# Patient Record
Sex: FEMALE | Race: WHITE | Employment: OTHER | URBAN - METROPOLITAN AREA
[De-identification: names, ages, dates, MRNs, and addresses within clinical notes are randomized per-mention and may not be internally consistent; named-entity substitution may affect disease eponyms.]

---

## 2017-05-25 ENCOUNTER — HOSPITAL ENCOUNTER (OUTPATIENT)
Dept: MAMMOGRAPHY | Facility: CLINIC | Age: 72
Discharge: HOME | End: 2017-05-25
Attending: INTERNAL MEDICINE | Admitting: INTERNAL MEDICINE
Payer: COMMERCIAL

## 2017-05-25 ENCOUNTER — OFFICE VISIT (OUTPATIENT)
Dept: FAMILY MEDICINE | Facility: CLINIC | Age: 72
End: 2017-05-25
Payer: COMMERCIAL

## 2017-05-25 VITALS
WEIGHT: 110 LBS | OXYGEN SATURATION: 100 % | DIASTOLIC BLOOD PRESSURE: 71 MMHG | BODY MASS INDEX: 19.49 KG/M2 | HEART RATE: 74 BPM | HEIGHT: 63 IN | SYSTOLIC BLOOD PRESSURE: 120 MMHG | TEMPERATURE: 97.4 F

## 2017-05-25 DIAGNOSIS — N95.2 ATROPHIC VAGINITIS: ICD-10-CM

## 2017-05-25 DIAGNOSIS — Z12.31 VISIT FOR SCREENING MAMMOGRAM: ICD-10-CM

## 2017-05-25 DIAGNOSIS — Z12.31 ENCOUNTER FOR SCREENING MAMMOGRAM FOR BREAST CANCER: ICD-10-CM

## 2017-05-25 DIAGNOSIS — L71.9 ROSACEA: ICD-10-CM

## 2017-05-25 DIAGNOSIS — L70.8 OTHER ACNE: ICD-10-CM

## 2017-05-25 DIAGNOSIS — Z00.00 ENCOUNTER FOR ROUTINE ADULT HEALTH EXAMINATION WITHOUT ABNORMAL FINDINGS: Primary | ICD-10-CM

## 2017-05-25 DIAGNOSIS — M85.80 OSTEOPENIA: ICD-10-CM

## 2017-05-25 DIAGNOSIS — R30.0 DYSURIA: ICD-10-CM

## 2017-05-25 LAB
ALBUMIN UR-MCNC: NEGATIVE MG/DL
APPEARANCE UR: CLEAR
BACTERIA #/AREA URNS HPF: ABNORMAL /HPF
BILIRUB UR QL STRIP: NEGATIVE
COLOR UR AUTO: YELLOW
ERYTHROCYTE [DISTWIDTH] IN BLOOD BY AUTOMATED COUNT: 13.4 % (ref 10–15)
GLUCOSE UR STRIP-MCNC: NEGATIVE MG/DL
HCT VFR BLD AUTO: 39.3 % (ref 35–47)
HGB BLD-MCNC: 13.2 G/DL (ref 11.7–15.7)
HGB UR QL STRIP: NEGATIVE
KETONES UR STRIP-MCNC: NEGATIVE MG/DL
LEUKOCYTE ESTERASE UR QL STRIP: ABNORMAL
MCH RBC QN AUTO: 30.4 PG (ref 26.5–33)
MCHC RBC AUTO-ENTMCNC: 33.6 G/DL (ref 31.5–36.5)
MCV RBC AUTO: 91 FL (ref 78–100)
NITRATE UR QL: NEGATIVE
NON-SQ EPI CELLS #/AREA URNS LPF: ABNORMAL /LPF
PH UR STRIP: 5.5 PH (ref 5–7)
PLATELET # BLD AUTO: 253 10E9/L (ref 150–450)
RBC # BLD AUTO: 4.34 10E12/L (ref 3.8–5.2)
RBC #/AREA URNS AUTO: ABNORMAL /HPF (ref 0–2)
SP GR UR STRIP: 1.01 (ref 1–1.03)
URN SPEC COLLECT METH UR: ABNORMAL
UROBILINOGEN UR STRIP-ACNC: 0.2 EU/DL (ref 0.2–1)
WBC # BLD AUTO: 6.2 10E9/L (ref 4–11)
WBC #/AREA URNS AUTO: ABNORMAL /HPF (ref 0–2)

## 2017-05-25 PROCEDURE — 80061 LIPID PANEL: CPT | Performed by: INTERNAL MEDICINE

## 2017-05-25 PROCEDURE — 36415 COLL VENOUS BLD VENIPUNCTURE: CPT | Performed by: INTERNAL MEDICINE

## 2017-05-25 PROCEDURE — 85027 COMPLETE CBC AUTOMATED: CPT | Performed by: INTERNAL MEDICINE

## 2017-05-25 PROCEDURE — 80053 COMPREHEN METABOLIC PANEL: CPT | Performed by: INTERNAL MEDICINE

## 2017-05-25 PROCEDURE — G0439 PPPS, SUBSEQ VISIT: HCPCS | Performed by: INTERNAL MEDICINE

## 2017-05-25 PROCEDURE — 81001 URINALYSIS AUTO W/SCOPE: CPT | Performed by: INTERNAL MEDICINE

## 2017-05-25 PROCEDURE — G0202 SCR MAMMO BI INCL CAD: HCPCS

## 2017-05-25 PROCEDURE — 82306 VITAMIN D 25 HYDROXY: CPT | Performed by: INTERNAL MEDICINE

## 2017-05-25 PROCEDURE — G0145 SCR C/V CYTO,THINLAYER,RESCR: HCPCS | Performed by: INTERNAL MEDICINE

## 2017-05-25 PROCEDURE — G0476 HPV COMBO ASSAY CA SCREEN: HCPCS | Performed by: INTERNAL MEDICINE

## 2017-05-25 RX ORDER — TRETINOIN 0.4 MG/G
GEL TOPICAL AT BEDTIME
Qty: 45 G | Refills: 3 | Status: SHIPPED | OUTPATIENT
Start: 2017-05-25 | End: 2019-05-06

## 2017-05-25 RX ORDER — TRETINOIN 1 MG/G
CREAM TOPICAL AT BEDTIME
Qty: 45 G | Refills: 3 | Status: SHIPPED | OUTPATIENT
Start: 2017-05-25 | End: 2017-05-25 | Stop reason: DRUGHIGH

## 2017-05-25 RX ORDER — TRETINOIN 0.4 MG/G
GEL TOPICAL
COMMUNITY
End: 2017-05-25

## 2017-05-25 NOTE — PROGRESS NOTES
SUBJECTIVE:                                                            Concepcion Velásquez is a 72 year old female who presents for Preventive Visit.    Are you in the first 12 months of your Medicare Part B coverage?  No    Healthy Habits:    Do you get at least three servings of calcium containing foods daily (dairy, green leafy vegetables, etc.)? yes    Amount of exercise or daily activities, outside of work: 7 day(s) per week    Problems taking medications regularly No    Medication side effects: No    Have you had an eye exam in the past two years? yes    Do you see a dentist twice per year? no    Do you have sleep apnea, excessive snoring or daytime drowsiness?no    COGNITIVE SCREEN  1) Repeat 3 items (Banana, Sunrise, Chair)    2) Clock draw: NORMAL  3) 3 item recall: Recalls 3 objects  Results: 3 items recalled: COGNITIVE IMPAIRMENT LESS LIKELY    Mini-CogTM Copyright S Steven. Licensed by the author for use in [facility] [place]; reprinted with permission ([email or web address]@[email or web address]). All rights reserved.        Patient presents to the clinic to the preventive health visit. She states that things are ok. She can't think of anything that concerns presently. She states that sometimes she will wake up with a headache, but she thinks it is attributed to the fact that she grinds her teeth when she sleeps. She denies any vision changes, radiating neck or back pain, dyspnea, angina, palpitations, racing heartbeats, bowel changes, hematochezia, abdomen discomfort, urinary changes, musculoskeletal changes, and skin changes. She has occasional neck and back discomfort that she relieves with visits to the chiropractor. She also states that she will get acid reflux with positional change, like bending over.    Medications:  Unchanged  Cologuard screening done at [facility]    Reviewed and updated as needed this visit by clinical staff  Allergies  Meds       Social History   Substance Use Topics     Smoking status: Never Smoker      Smokeless tobacco: Not on file     Alcohol use 0.0 oz/week     0 Standard drinks or equivalent per week      Comment: 1/2 glass with dinner       The patient does not drink >3 drinks per day nor >7 drinks per week.    Today's PHQ-2 Score:   PHQ-2 ( 1999 Pf[employer]) 10/5/2016   Q1: Little interest or pleasure in doing things 0   Q2: Feeling down, depressed or hopeless 0   PHQ-2 Score 0     Do you feel safe in your environment - Yes    Do you have a Health Care Directive?: No: Advance care planning reviewed with patient; information given to patient to review.    Current providers sharing in care for this patient include:   Patient Care Team:  Jose Alberto Ziegler MD as PCP - General (Internal Medicine)      Hearing impairment: No    Ability to successfully perform activities of daily living: Yes, no assistance needed     Fall risk:       Home safety:  none identified    The following health maintenance items are reviewed in Epic and correct as of today:  Health Maintenance   Topic Date Due     TETANUS IMMUNIZATION (SYSTEM ASSIGNED)  05/06/1962     ADVANCE DIRECTIVE PLANNING Q5 YRS  05/06/1962     COLON CANCER SCREEN (SYSTEM ASSIGNED)  05/06/1994     FALL RISK ASSESSMENT  05/06/2009     PNEUMOCOCCAL (1 of 2 - PCV13) 05/06/2009     MAMMO SCREEN Q2 YR (SYSTEM ASSIGNED)  05/04/2013     INFLUENZA VACCINE (SYSTEM ASSIGNED)  09/01/2017     LIPID SCREEN Q5 YR FEMALE (SYSTEM ASSIGNED)  05/23/2021     DEXA SCAN SCREENING (SYSTEM ASSIGNED)  Completed     Mammogram Screening: Patient over age 50, mutual decision to screen reflected in health maintenance.     ROS:  C: NEGATIVE for fever, chills, change in weight  I: NEGATIVE for worrisome rashes, moles or lesions  E: NEGATIVE for vision changes or irritation  E/M: NEGATIVE for ear, mouth and throat problems  R: NEGATIVE for significant cough or SOB  B: NEGATIVE for masses, tenderness or discharge  CV: NEGATIVE for chest pain, palpitations or peripheral edema  GI: NEGATIVE for nausea,  "abdominal pain, heartburn, or change in bowel habits  : NEGATIVE for frequency, dysuria, or hematuria  M: NEGATIVE for significant arthralgias or myalgia  N: NEGATIVE for weakness, dizziness or paresthesias  E: NEGATIVE for temperature intolerance, skin/hair changes  H: NEGATIVE for bleeding problems  P: NEGATIVE for changes in mood or affect    Current Outpatient Prescriptions   Medication Sig Dispense Refill     Tretinoin (RETIN-A EX)        estrogens, conjugated, (PREMARIN) 0.3 MG tablet Take 1 tablet (0.3 mg) by mouth daily 30 tablet 1     Multiple Vitamins-Minerals (CENTRUM PO)        Cholecalciferol (VITAMIN D3 PO) Take 1,000 Units by mouth daily       aspirin 81 MG tablet Take by mouth daily       vitamin  B complex with vitamin C (VITAMIN  B COMPLEX) TABS Take 1 tablet by mouth daily       MECLIZINE HCL PO Take 12.5 mg by mouth as needed        No Known Allergies     This document serves as a record of the services and decisions personally performed and made by Jose Alberto Ziegler MD. It was created on his/her behalf by Jon Carcamo, a trained medical scribe. The creation of this document is based the provider's statements to the medical scribe.  Scrgiovanny Carcamo 10:25 AM, May 25, 2017    OBJECTIVE:                                                            /71 (BP Location: Right arm, Patient Position: Chair, Cuff Size: Adult Regular)  Pulse 74  Temp 97.4  F (36.3  C) (Tympanic)  Ht 1.588 m (5' 2.5\")  Wt 49.9 kg (110 lb)  SpO2 100%  Breastfeeding? No  BMI 19.8 kg/m2 Estimated body mass index is 19.84 kg/(m^2) as calculated from the following:    Height as of 10/5/16: 5' 3\" (1.6 m).    Weight as of 10/5/16: 112 lb (50.8 kg).  EXAM:   GENERAL APPEARANCE: healthy, alert and no distress  EYES: Eyes grossly normal to inspection, PERRL and conjunctivae and sclerae normal  HENT: ear canals and TM's normal, nose and mouth without ulcers or lesions, oropharynx clear and oral mucous membranes " moist  NECK: no adenopathy, no asymmetry, masses, or scars and thyroid normal to palpation  RESP: lungs clear to auscultation - no rales, rhonchi or wheezes  BREAST: normal without masses, tenderness or nipple discharge and no palpable axillary masses or adenopathy, implants are intact  CV: regular rate and rhythm, normal S1 S2, no S3 or S4, no murmur, click or rub, no peripheral edema and peripheral pulses strong  ABDOMEN: soft, nontender, no hepatosplenomegaly, no masses and bowel sounds normal  MS: no musculoskeletal defects are noted and gait is age appropriate without ataxia  : her vaginal membranes are atrophic without friability, cervix unremarkable, uterus is small, adnexa is non tender, rectal exam was unremarkable  SKIN: no suspicious lesions or rashes  NEURO: Normal strength and tone, sensory exam grossly normal, mentation intact and speech normal  PSYCH: mentation appears normal and affect normal/bright    ASSESSMENT / PLAN:                                                            1. Encounter for routine adult health examination without abnormal findings  - UA reflex to Microscopic and Culture  - CBC with platelets  - Comprehensive metabolic panel  - Lipid panel reflex to direct LDL  - Urine Microscopic    2. Osteopenia  - Vitamin D Deficiency    3. Encounter for screening mammogram for breast cancer  -Patient is scheduled for mammogram screening today    4. Dysuria    5. Other acne    6. Rosacea  - tretinoin (RETIN-A) 0.1 % cream; Apply topically At Bedtime  Dispense: 45 g; Refill: 3  - tretinoin microsphere (RETIN-A MICRO) 0.04 % GEL topical gel; Apply topically At Bedtime  Dispense: 45 g; Refill: 3    7. Atrophic vaginitis  - estrogens, conjugated, (PREMARIN) 0.3 MG tablet; Take 1 tablet (0.3 mg) by mouth daily  Dispense: 30 tablet; Refill: 1    End of Life Planning:  Patient currently has an advanced directive: No.  I have verified the patient's ablity to prepare an advanced directive/make  "health care decisions.  Literature was provided to assist patient in preparing an advanced directive.    COUNSELING:  Reviewed preventive health counseling, as reflected in patient instructions       Regular exercise       Healthy diet/nutrition       Vision screening       Dental care    Estimated body mass index is 19.84 kg/(m^2) as calculated from the following:    Height as of 10/5/16: 5' 3\" (1.6 m).    Weight as of 10/5/16: 112 lb (50.8 kg).     reports that she has never smoked. She does not have any smokeless tobacco history on file.    Appropriate preventive services were discussed with this patient, including applicable screening as appropriate for cardiovascular disease, diabetes, osteopenia/osteoporosis, and glaucoma.  As appropriate for age/gender, discussed screening for colorectal cancer, prostate cancer, breast cancer, and cervical cancer. Checklist reviewing preventive services available has been given to the patient.    Reviewed patients plan of care and provided an AVS. The Basic Care Plan (routine screening as documented in Health Maintenance) for Concepcion meets the Care Plan requirement. This Care Plan has been established and reviewed with the Patient.    Counseling Resources:  ATP IV Guidelines  Pooled Cohorts Equation Calculator  Breast Cancer Risk Calculator  FRAX Risk Assessment  ICSI Preventive Guidelines  Dietary Guidelines for Americans, 2010  USDA's MyPlate  ASA Prophylaxis  Lung CA Screening    The information in this document, created by the medical scribe for me, accurately reflects the services I personally performed and the decisions made by me. I have reviewed and approved this document for accuracy prior to leaving the patient care area.  Jose Alberto Ziegler MD  10:11 AM, 05/25/17    Jose Alberto Ziegler MD  [facility]  "

## 2017-05-25 NOTE — MR AVS SNAPSHOT
After Visit Summary   5/25/2017    Concepcion Velásquez    MRN: [id number]           Patient Information     Date Of Birth          1945        Visit Information        Provider Department      5/25/2017 9:30 AM Jose Alberto Ziegler MD [facility]        Today's Diagnoses     Encounter for routine adult health examination without abnormal findings    -  1    Osteopenia        Encounter for screening mammogram for breast cancer        Dysuria        Other acne        Rosacea        Atrophic vaginitis          Care Instructions      Preventive Health Recommendations    Female Ages 65 +    Yearly exam:     See your health care provider every year in order to  o Review health changes.   o Discuss preventive care.    o Review your medicines if your doctor has prescribed any.      You no longer need a yearly Pap test unless you've had an abnormal Pap test in the past 10 years. If you have vaginal symptoms, such as bleeding or discharge, be sure to talk with your provider about a Pap test.      Every 1 to 2 years, have a mammogram.  If you are over 69, talk with your health care provider about whether or not you want to continue having screening mammograms.      Every 10 years, have a colonoscopy. Or, have a yearly FIT test (stool test). These exams will check for colon cancer.       Have a cholesterol test every 5 years, or more often if your doctor advises it.       Have a diabetes test (fasting glucose) every three years. If you are at risk for diabetes, you should have this test more often.       At age 65, have a bone density scan (DEXA) to check for osteoporosis (brittle bone disease).    Shots:    Get a flu shot each year.    Get a tetanus shot every 10 years.    Talk to your doctor about your pneumonia vaccines. There are now two you should receive - Pneumovax (PPSV 23) and Prevnar (PCV 13).    Talk to your doctor about the shingles vaccine.    Talk to your doctor about the hepatitis B  vaccine.    Nutrition:     Eat at least 5 servings of fruits and vegetables each day.      Eat whole-grain bread, whole-wheat pasta and brown rice instead of white grains and rice.      Talk to your provider about Calcium and Vitamin D.     Lifestyle    Exercise at least 150 minutes a week (30 minutes a day, 5 days a week). This will help you control your weight and prevent disease.      Limit alcohol to one drink per day.      No smoking.       Wear sunscreen to prevent skin cancer.       See your dentist twice a year for an exam and cleaning.      See your eye doctor every 1 to 2 years to screen for conditions such as glaucoma, macular degeneration and cataracts.          Follow-ups after your visit        Who to contact     If you have questions or need follow up information about today's clinic visit or your schedule please contact [facility] directly at [phone].  Normal or non-critical lab and imaging results will be communicated to you by "[employer]hart, letter or phone within 4 business days after the clinic has received the results. If you do not hear from us within 7 days, please contact the clinic through [employer] or phone. If you have a critical or abnormal lab result, we will notify you by phone as soon as possible.  Submit refill requests through [employer] or call your pharmacy and they will forward the refill request to us. Please allow 3 business days for your refill to be completed.          Additional Information About Your Visit        [employer] Information     [employer] gives you secure access to your electronic health record. If you see a primary care provider, you can also send messages to your care team and make appointments. If you have questions, please call your primary care clinic.  If you do not have a primary care provider, please call [phone] and they will assist you.        Care EveryWhere ID     This is your Care EveryWhere ID. This could be used by other organizations to  "access your [place] medical records  [id number]        Your Vitals Were     Pulse Temperature Height Pulse Oximetry Breastfeeding? BMI (Body Mass Index)    74 97.4  F (36.3  C) (Tympanic) 5' 2.5\" (1.588 m) 100% No 19.8 kg/m2       Blood Pressure from Last 3 Encounters:   05/25/17 120/71   10/05/16 105/70    Weight from Last 3 Encounters:   05/25/17 110 lb (49.9 kg)   10/05/16 112 lb (50.8 kg)              We Performed the Following     CBC with platelets     Comprehensive metabolic panel     Lipid panel reflex to direct LDL     Pap imaged thin layer screen with HPV - recommended age 30 - 65 years (select HPV order below)     UA reflex to Microscopic and Culture     Urine Microscopic     Vitamin D Deficiency          Today's Medication Changes          These changes are accurate as of: 5/25/17 11:59 PM.  If you have any questions, ask your nurse or doctor.               These medicines have changed or have updated prescriptions.        Dose/Directions    tretinoin microsphere 0.04 % Gel topical gel   Commonly known as:  RETIN-A MICRO   This may have changed:    - when to take this  - reasons to take this   Used for:  Rosacea   Changed by:  Jose Alberto Ziegler MD        Apply topically At Bedtime   Quantity:  45 g   Refills:  3         Stop taking these medicines if you haven't already. Please contact your care team if you have questions.     PREMARIN PO   Stopped by:  Jose Alberto Ziegler MD           RETIN-A EX   Stopped by:  Jose Alberto Ziegler MD                Where to get your medicines      Some of these will need a paper prescription and others can be bought over the counter.  Ask your nurse if you have questions.     Bring a paper prescription for each of these medications     estrogens (conjugated) 0.3 MG tablet    tretinoin microsphere 0.04 % Gel topical gel                Primary Care Provider Office Phone # Fax #    Jose Alberto Ziegler [phone]       [facility] [address]  [facility] [address]        Thank you!     Thank you for choosing [facility]  for your care. Our goal is always to provide you with excellent care. Hearing back from our patients is one way we can continue to improve our services. Please take a few minutes to complete the written survey that you may receive in the mail after your visit with us. Thank you!             Your Updated Medication List - Protect others around you: Learn how to safely use, store and throw away your medicines at www.disposemymeds.org.          This list is accurate as of: 5/25/17 11:59 PM.  Always use your most recent med list.                   Brand Name Dispense Instructions for use    aspirin 81 MG tablet      Take by mouth daily       CENTRUM PO          estrogens (conjugated) 0.3 MG tablet    PREMARIN    30 tablet    Take 1 tablet (0.3 mg) by mouth daily       MECLIZINE HCL PO      Take 12.5 mg by mouth as needed       tretinoin microsphere 0.04 % Gel topical gel    RETIN-A MICRO    45 g    Apply topically At Bedtime       vitamin B complex with vitamin C Tabs tablet      Take 1 tablet by mouth daily       VITAMIN D3 PO      Take 1,000 Units by mouth daily

## 2017-05-26 LAB
ALBUMIN SERPL-MCNC: 3.8 G/DL (ref 3.4–5)
ALP SERPL-CCNC: 86 U/L (ref 40–150)
ALT SERPL W P-5'-P-CCNC: 21 U/L (ref 0–50)
ANION GAP SERPL CALCULATED.3IONS-SCNC: 6 MMOL/L (ref 3–14)
AST SERPL W P-5'-P-CCNC: 23 U/L (ref 0–45)
BILIRUB SERPL-MCNC: 0.4 MG/DL (ref 0.2–1.3)
BUN SERPL-MCNC: 12 MG/DL (ref 7–30)
CALCIUM SERPL-MCNC: 9.3 MG/DL (ref 8.5–10.1)
CHLORIDE SERPL-SCNC: 107 MMOL/L (ref 94–109)
CHOLEST SERPL-MCNC: 175 MG/DL
CO2 SERPL-SCNC: 30 MMOL/L (ref 20–32)
CREAT SERPL-MCNC: 0.7 MG/DL (ref 0.52–1.04)
DEPRECATED CALCIDIOL+CALCIFEROL SERPL-MC: 59 UG/L (ref 20–75)
GFR SERPL CREATININE-BSD FRML MDRD: 82 ML/MIN/1.7M2
GLUCOSE SERPL-MCNC: 91 MG/DL (ref 70–99)
HDLC SERPL-MCNC: 66 MG/DL
LDLC SERPL CALC-MCNC: 81 MG/DL
NONHDLC SERPL-MCNC: 109 MG/DL
POTASSIUM SERPL-SCNC: 4.4 MMOL/L (ref 3.4–5.3)
PROT SERPL-MCNC: 7.5 G/DL (ref 6.8–8.8)
SODIUM SERPL-SCNC: 143 MMOL/L (ref 133–144)
TRIGL SERPL-MCNC: 140 MG/DL

## 2017-05-30 LAB
COPATH REPORT: NORMAL
PAP: NORMAL

## 2017-06-01 LAB
FINAL DIAGNOSIS: NORMAL
HPV HR 12 DNA CVX QL NAA+PROBE: NEGATIVE
HPV16 DNA SPEC QL NAA+PROBE: NEGATIVE
HPV18 DNA SPEC QL NAA+PROBE: NEGATIVE
SPECIMEN DESCRIPTION: NORMAL

## 2017-06-06 PROBLEM — Z12.4 CERVICAL CANCER SCREENING: Status: ACTIVE | Noted: 2017-06-06

## 2017-12-01 ENCOUNTER — TELEPHONE (OUTPATIENT)
Dept: FAMILY MEDICINE | Facility: CLINIC | Age: 72
End: 2017-12-01

## 2017-12-01 NOTE — TELEPHONE ENCOUNTER
"12/01/2017        Patient Communication Preferences indicate  Do not contact  and/or communication by \"Phone\" is not preferred. Call not required per Outreach team.          Outreach ,  Juan Alberto Sandhu     "

## 2018-02-05 ENCOUNTER — OFFICE VISIT (OUTPATIENT)
Dept: FAMILY MEDICINE | Facility: CLINIC | Age: 73
End: 2018-02-05
Payer: COMMERCIAL

## 2018-02-05 VITALS
DIASTOLIC BLOOD PRESSURE: 81 MMHG | WEIGHT: 112 LBS | BODY MASS INDEX: 19.84 KG/M2 | HEART RATE: 84 BPM | HEIGHT: 63 IN | SYSTOLIC BLOOD PRESSURE: 123 MMHG | TEMPERATURE: 97.2 F | OXYGEN SATURATION: 97 %

## 2018-02-05 DIAGNOSIS — R23.8 PAPULE: ICD-10-CM

## 2018-02-05 DIAGNOSIS — R30.0 DYSURIA: ICD-10-CM

## 2018-02-05 DIAGNOSIS — L30.9 DERMATITIS: Primary | ICD-10-CM

## 2018-02-05 LAB
ALBUMIN UR-MCNC: NEGATIVE MG/DL
APPEARANCE UR: CLEAR
BILIRUB UR QL STRIP: NEGATIVE
COLOR UR AUTO: YELLOW
GLUCOSE UR STRIP-MCNC: NEGATIVE MG/DL
HGB UR QL STRIP: NEGATIVE
KETONES UR STRIP-MCNC: NEGATIVE MG/DL
LEUKOCYTE ESTERASE UR QL STRIP: NEGATIVE
NITRATE UR QL: NEGATIVE
PH UR STRIP: 7 PH (ref 5–7)
SOURCE: NORMAL
SP GR UR STRIP: 1.01 (ref 1–1.03)
UROBILINOGEN UR STRIP-ACNC: 0.2 EU/DL (ref 0.2–1)

## 2018-02-05 PROCEDURE — 81003 URINALYSIS AUTO W/O SCOPE: CPT | Performed by: INTERNAL MEDICINE

## 2018-02-05 PROCEDURE — 88305 TISSUE EXAM BY PATHOLOGIST: CPT | Performed by: INTERNAL MEDICINE

## 2018-02-05 PROCEDURE — 11400 EXC TR-EXT B9+MARG 0.5 CM<: CPT | Performed by: INTERNAL MEDICINE

## 2018-02-05 PROCEDURE — 99213 OFFICE O/P EST LOW 20 MIN: CPT | Mod: 25 | Performed by: INTERNAL MEDICINE

## 2018-02-05 NOTE — PROGRESS NOTES
SUBJECTIVE:   Concepcion Velásquez is a 72 year old female who presents to clinic today for the following health issues:      Patient presents to clinic for follow up on urinary symptoms, and also to discuss concern with skin spot on left forearm. Spot on arm is reddish, purple in color unsure when first presented possibly within last month- two months. She presents to the clinic to follow up on a maculo papular rash on her left anteior forearm. She has been using Retin A cream that seemed to have paired it down, but the spot is new and worrisome.  She also has a lesion on her left shoulder which is lsightly larger and she has been using RetinA on that lesion without any success. However she has changedd her bra straps because they seem to be irritating the lesion which seems to have diminished the local irritation  She has been having some dysuria which is slightly different than what one would expect with a regular bladder infection. The symptoms were intensified recently and she did choose to use Cipro which she had on hand, the number of days was a few.        Problem list and histories reviewed & adjusted, as indicated.  Additional history: as documented    Current Outpatient Prescriptions   Medication Sig Dispense Refill     estrogens, conjugated, (PREMARIN) 0.3 MG tablet Take 1 tablet (0.3 mg) by mouth daily 30 tablet 1     tretinoin microsphere (RETIN-A MICRO) 0.04 % GEL topical gel Apply topically At Bedtime 45 g 3     Multiple Vitamins-Minerals (CENTRUM PO)        Cholecalciferol (VITAMIN D3 PO) Take 1,000 Units by mouth daily       aspirin 81 MG tablet Take by mouth daily       vitamin  B complex with vitamin C (VITAMIN  B COMPLEX) TABS Take 1 tablet by mouth daily       MECLIZINE HCL PO Take 12.5 mg by mouth as needed        No Known Allergies    Reviewed and updated as needed this visit by clinical staff       Reviewed and updated as needed this visit by Provider         ROS:  Constitutional, HEENT,  "cardiovascular, pulmonary, gi and gu systems are negative, except as otherwise noted.    This document serves as a record of the services and decisions personally performed and made by Jose Alberto Ziegler MD. It was created on his/her behalf by Jon Carcamo, a trained medical scribe. The creation of this document is based the provider's statements to the medical scribe.  Scriblizbeth Carcamo 9:44 AM, February 5, 2018    OBJECTIVE:     /81 (BP Location: Left arm, Patient Position: Sitting, Cuff Size: Adult Regular)  Pulse 84  Temp 97.2  F (36.2  C) (Oral)  Ht 1.588 m (5' 2.5\")  Wt 50.8 kg (112 lb)  SpO2 97%  Breastfeeding? No  BMI 20.16 kg/m2  Body mass index is 20.16 kg/(m^2).    Neck was supple without adenopathy or thyromegaly her carotids were normal without bruits, left shoulder there is a venus lake  Chest clear to auscultation and percussion  Cardiovascular S1 and S2 are physiologic without murmurs or gallops  Abdomen bowel sounds were normal.  There is no palpable mass or organomegaly  Arm: left anterior forearm there is a maculopapular slightly pigmented area of the forearm which does not have the characteristics of a pearly papule and is probably a denuded seborrheic keratosis  She has a few areas on her shoulders and right forearm which could be paired down seborrheic keratoses  Extremities nontender without any edema  Pulses pedal pulses are as described otherwise his pulses are bilaterally symmetrical throughout without bruits  Skin without significant abnormality    Procedure:    Because of the uncertainty of the left forearm lesion the patient requested a biopsy to have a complete determination. The area was prepped with betadine with subsequent lido with epi, a 2 mm punch biopsy was obtained without difficulty. The wound was cauterized with silver nitrate.    Diagnostic Test Results:  Results for orders placed or performed in visit on 02/05/18 (from the past 24 hour(s))   *UA reflex " to Microscopic and Culture ([place] and [facility] (except [place] and [place])   Result Value Ref Range    Color Urine Yellow     Appearance Urine Clear     Glucose Urine Negative NEG^Negative mg/dL    Bilirubin Urine Negative NEG^Negative    Ketones Urine Negative NEG^Negative mg/dL    Specific Gravity Urine 1.010 1.003 - 1.035    Blood Urine Negative NEG^Negative    pH Urine 7.0 5.0 - 7.0 pH    Protein Albumin Urine Negative NEG^Negative mg/dL    Urobilinogen Urine 0.2 0.2 - 1.0 EU/dL    Nitrite Urine Negative NEG^Negative    Leukocyte Esterase Urine Negative NEG^Negative    Source Midstream Urine        ASSESSMENT/PLAN:     1. Dermatitis    2. Dysuria  -Her symptoms could be related to atrophic vaginitis with localized urethritis, the patient was recommended to use Bacitracin ointment as a barrier and to practice keagle exercises regularly.  - *UA reflex to Microscopic and Culture ([place] and [facility] (except [place] and [place])    -Will follow up by telephone, she will return to clinic if she decides to remove the venus lake from her left shoulder.    Jose Alberto Ziegler MD  [facility]    The information in this document, created by the medical scribe for me, accurately reflects the services I personally performed and the decisions made by me. I have reviewed and approved this document for accuracy prior to leaving the patient care area.  Jose Alberto Ziegler MD  10:36 AM, 02/05/18

## 2018-02-05 NOTE — MR AVS SNAPSHOT
"              After Visit Summary   2/5/2018    Concepcion Velásquez    MRN: [id number]           Patient Information     Date Of Birth          1945        Visit Information        Provider Department      2/5/2018 9:30 AM Jose Alberto Ziegler MD [facility]        Today's Diagnoses     Dermatitis    -  1    Dysuria        Papule           Follow-ups after your visit        Who to contact     If you have questions or need follow up information about today's clinic visit or your schedule please contact [facility] directly at [phone].  Normal or non-critical lab and imaging results will be communicated to you by Living Indiehart, letter or phone within 4 business days after the clinic has received the results. If you do not hear from us within 7 days, please contact the clinic through [employer] or phone. If you have a critical or abnormal lab result, we will notify you by phone as soon as possible.  Submit refill requests through [employer] or call your pharmacy and they will forward the refill request to us. Please allow 3 business days for your refill to be completed.          Additional Information About Your Visit        MyChart Information     [employer] gives you secure access to your electronic health record. If you see a primary care provider, you can also send messages to your care team and make appointments. If you have questions, please call your primary care clinic.  If you do not have a primary care provider, please call [phone] and they will assist you.        Care EveryWhere ID     This is your Care EveryWhere ID. This could be used by other organizations to access your [place] medical records  [id number]        Your Vitals Were     Pulse Temperature Height Pulse Oximetry Breastfeeding? BMI (Body Mass Index)    84 97.2  F (36.2  C) (Oral) 5' 2.5\" (1.588 m) 97% No 20.16 kg/m2       Blood Pressure from Last 3 Encounters:   02/05/18 123/81   05/25/17 120/71   10/05/16 105/70    Weight from Last " 3 Encounters:   02/05/18 112 lb (50.8 kg)   05/25/17 110 lb (49.9 kg)   10/05/16 112 lb (50.8 kg)              We Performed the Following     *UA reflex to Microscopic and Culture ([place] [facility] (except [place] and [place])     BIOPSY SKIN/SUBQ/MUC MEM, EACH ADDTL LESION     Surgical pathology exam        Primary Care Provider Office Phone # Fax #    Jose Alberto Ziegler [phone] [address] [facility] [address]  [facility] [address]        Equal Access to Services     [facility]: Hadii aad ku hadasho Soomaali, waaxda luqadaha, qaybta kaalmada adeegyazoë, erwin carbone . So [facility] [phone].    ATENCIÓN: Si habla español, tiene a layne disposición servicios gratuitos de asistencia lingüística. LlGrand [facility] [phone].    We comply with applicable federal civil rights laws and [place] laws. We do not discriminate on the basis of race, color, national origin, age, disability, sex, sexual orientation, or gender identity.            Thank you!     Thank you for choosing [facility]  for your care. Our goal is always to provide you with excellent care. Hearing back from our patients is one way we can continue to improve our services. Please take a few minutes to complete the written survey that you may receive in the mail after your visit with us. Thank you!             Your Updated Medication List - Protect others around you: Learn how to safely use, store and throw away your medicines at www.disposemymeds.org.          This list is accurate as of 2/5/18 11:59 PM.  Always use your most recent med list.                   Brand Name Dispense Instructions for use Diagnosis    aspirin 81 MG tablet      Take by mouth daily        CENTRUM PO           estrogens (conjugated) 0.3 MG tablet    PREMARIN    30 tablet    Take 1 tablet (0.3 mg) by mouth daily    Atrophic vaginitis       MECLIZINE HCL PO      Take 12.5 mg by mouth as needed        tretinoin  microsphere 0.04 % Gel topical gel    RETIN-A MICRO    45 g    Apply topically At Bedtime    Rosacea       vitamin B complex with vitamin C Tabs tablet      Take 1 tablet by mouth daily        VITAMIN D3 PO      Take 1,000 Units by mouth daily

## 2018-02-08 LAB — COPATH REPORT: NORMAL

## 2018-02-12 NOTE — PROGRESS NOTES
I called the patient and informed of results. The following changes were made to treatment:   Actinic keratosis.   rec N2 after bx site healed.     Patient voices understanding and will contact me with any further questions.     Jose Alberto Ziegler MD

## 2018-03-13 ENCOUNTER — OFFICE VISIT (OUTPATIENT)
Dept: FAMILY MEDICINE | Facility: CLINIC | Age: 73
End: 2018-03-13
Payer: COMMERCIAL

## 2018-03-13 VITALS
TEMPERATURE: 97.9 F | DIASTOLIC BLOOD PRESSURE: 66 MMHG | HEART RATE: 72 BPM | SYSTOLIC BLOOD PRESSURE: 114 MMHG | HEIGHT: 63 IN | BODY MASS INDEX: 19.84 KG/M2 | WEIGHT: 112 LBS | OXYGEN SATURATION: 99 %

## 2018-03-13 DIAGNOSIS — L57.0 ACTINIC KERATOSIS: Primary | ICD-10-CM

## 2018-03-13 PROCEDURE — 17000 DESTRUCT PREMALG LESION: CPT | Performed by: INTERNAL MEDICINE

## 2018-03-13 NOTE — NURSING NOTE
"Chief Complaint   Patient presents with     Derm Problem     sun spot on left forearm that needs frozen off per Dr. Ziegler       Initial /66 (BP Location: Left arm, Cuff Size: Adult Regular)  Pulse 72  Temp 97.9  F (36.6  C) (Oral)  Ht 5' 2.5\" (1.588 m)  Wt 112 lb (50.8 kg)  SpO2 99%  BMI 20.16 kg/m2 Estimated body mass index is 20.16 kg/(m^2) as calculated from the following:    Height as of this encounter: 5' 2.5\" (1.588 m).    Weight as of this encounter: 112 lb (50.8 kg).  Medication Reconciliation: complete     Debra Allen MA    "

## 2018-03-13 NOTE — PROGRESS NOTES
SUBJECTIVE:   Concepcion Velásquez is a 72 year old female who presents to clinic today for the following health issues:      Chief Complaint   Patient presents with     Derm Problem     sun spot on left forearm that needs frozen off per Dr. Ziegler     Patient presents to the clinic to follow up on her left forearm lesion that we biopsied that was an actinic keratoses. Otherwise she hasnt' noticed any other skin changes although she would like to review other spots on her skin.  Of note, her  has shingles and she is cnocerned about whether she should have another shingles shot.      Problem list and histories reviewed & adjusted, as indicated.  Additional history: as documented    Current Outpatient Prescriptions   Medication Sig Dispense Refill     estrogens, conjugated, (PREMARIN) 0.3 MG tablet Take 1 tablet (0.3 mg) by mouth daily 30 tablet 1     tretinoin microsphere (RETIN-A MICRO) 0.04 % GEL topical gel Apply topically At Bedtime 45 g 3     Multiple Vitamins-Minerals (CENTRUM PO)        Cholecalciferol (VITAMIN D3 PO) Take 1,000 Units by mouth daily       aspirin 81 MG tablet Take by mouth daily       vitamin  B complex with vitamin C (VITAMIN  B COMPLEX) TABS Take 1 tablet by mouth daily       MECLIZINE HCL PO Take 12.5 mg by mouth as needed        No Known Allergies    Reviewed and updated as needed this visit by clinical staff  Allergies  Meds       Reviewed and updated as needed this visit by Provider         ROS:  Constitutional, HEENT, cardiovascular, pulmonary, gi and gu systems are negative, except as otherwise noted.    This document serves as a record of the services and decisions personally performed and made by Jose Alberto Ziegler MD. It was created on his/her behalf by Jon Carcamo, a trained medical scribe. The creation of this document is based the provider's statements to the medical scribe.  Toyin Carcamo 11:52 AM, March 13, 2018    OBJECTIVE:     /66 (BP Location: Left  "arm, Cuff Size: Adult Regular)  Pulse 72  Temp 97.9  F (36.6  C) (Oral)  Ht 1.588 m (5' 2.5\")  Wt 50.8 kg (112 lb)  SpO2 99%  BMI 20.16 kg/m2  Body mass index is 20.16 kg/(m^2).    Left shoulder has a seborrheic keratosis and a papular av malformation that is about 2-3mm in diameter. She has another seborrheic keratoses on the lateral aspect of her left forearm    Procedure:  The actinic keratosis on her left forearm treated with liquid nitrogen x2    Diagnostic Test Results:  No results found for this or any previous visit (from the past 24 hour(s)).    ASSESSMENT/PLAN:     1. Actinic keratosis  -New shingles shot available later in the year and we will keep her up to date.  2.inflamed seborrheic keratosis  Jose Alberto Ziegler MD  [facility]    The information in this document, created by the medical scribe for me, accurately reflects the services I personally performed and the decisions made by me. I have reviewed and approved this document for accuracy prior to leaving the patient care area.  Jose Alberto Ziegler MD  12:05 PM, 03/13/18      "

## 2018-03-13 NOTE — MR AVS SNAPSHOT
"              After Visit Summary   3/13/2018    Concepcion Velásquez    MRN: [id number]           Patient Information     Date Of Birth          1945        Visit Information        Provider Department      3/13/2018 11:30 AM Jose Alberto Ziegler MD [facility]        Today's Diagnoses     Actinic keratosis    -  1       Follow-ups after your visit        Who to contact     If you have questions or need follow up information about today's clinic visit or your schedule please contact [facility] directly at [phone].  Normal or non-critical lab and imaging results will be communicated to you by Jangl SMShart, letter or phone within 4 business days after the clinic has received the results. If you do not hear from us within 7 days, please contact the clinic through F[employer]ING [employer] or phone. If you have a critical or abnormal lab result, we will notify you by phone as soon as possible.  Submit refill requests through [employer] or call your pharmacy and they will forward the refill request to us. Please allow 3 business days for your refill to be completed.          Additional Information About Your Visit        MyChart Information     [employer] gives you secure access to your electronic health record. If you see a primary care provider, you can also send messages to your care team and make appointments. If you have questions, please call your primary care clinic.  If you do not have a primary care provider, please call [phone] and they will assist you.        Care EveryWhere ID     This is your Care EveryWhere ID. This could be used by other organizations to access your [place] medical records  [id number]        Your Vitals Were     Pulse Temperature Height Pulse Oximetry BMI (Body Mass Index)       72 97.9  F (36.6  C) (Oral) 5' 2.5\" (1.588 m) 99% 20.16 kg/m2        Blood Pressure from Last 3 Encounters:   03/13/18 114/66   02/05/18 123/81   05/25/17 120/71    Weight from Last 3 Encounters:   03/13/18 112 lb " (50.8 kg)   02/05/18 112 lb (50.8 kg)   05/25/17 110 lb (49.9 kg)              We Performed the Following     DESTRUCT PREMALIGNANT LESION, FIRST        Primary Care Provider Office Phone # Fax #    Jose Alberto Ziegler [phone] [address] [facility] [address]  [facility] [address]        Equal Access to Services     [facility] : Hadii aad ku hadasho Soomaali, waaxda luqadaha, qaybta kaalmada adeegyada, waxay idiin hayaan adeeg khsimona laGwenluis enrique . So [facility] [phone].    ATENCIÓN: Si kimberly tobias, tiene a layne disposición servicios gratuitos de asistencia lingüística. Llame al [phone].    We comply with applicable federal civil rights laws and [place] laws. We do not discriminate on the basis of race, color, national origin, age, disability, sex, sexual orientation, or gender identity.            Thank you!     Thank you for choosing [facility]  for your care. Our goal is always to provide you with excellent care. Hearing back from our patients is one way we can continue to improve our services. Please take a few minutes to complete the written survey that you may receive in the mail after your visit with us. Thank you!             Your Updated Medication List - Protect others around you: Learn how to safely use, store and throw away your medicines at www.disposemymeds.org.          This list is accurate as of 3/13/18 11:59 PM.  Always use your most recent med list.                   Brand Name Dispense Instructions for use Diagnosis    aspirin 81 MG tablet      Take by mouth daily        CENTRUM PO           estrogens (conjugated) 0.3 MG tablet    PREMARIN    30 tablet    Take 1 tablet (0.3 mg) by mouth daily    Atrophic vaginitis       MECLIZINE HCL PO      Take 12.5 mg by mouth as needed        tretinoin microsphere 0.04 % Gel topical gel    RETIN-A MICRO    45 g    Apply topically At Bedtime    Rosacea       vitamin B complex with vitamin C Tabs tablet      Take 1 tablet by mouth  daily        VITAMIN D3 PO      Take 1,000 Units by mouth daily

## 2019-05-06 ENCOUNTER — HOSPITAL ENCOUNTER (OUTPATIENT)
Dept: MAMMOGRAPHY | Facility: CLINIC | Age: 74
Discharge: HOME | End: 2019-05-06
Attending: INTERNAL MEDICINE | Admitting: INTERNAL MEDICINE
Payer: COMMERCIAL

## 2019-05-06 ENCOUNTER — OFFICE VISIT (OUTPATIENT)
Dept: FAMILY MEDICINE | Facility: CLINIC | Age: 74
End: 2019-05-06
Payer: COMMERCIAL

## 2019-05-06 VITALS
DIASTOLIC BLOOD PRESSURE: 79 MMHG | HEIGHT: 63 IN | OXYGEN SATURATION: 99 % | WEIGHT: 111.5 LBS | TEMPERATURE: 97.6 F | BODY MASS INDEX: 19.76 KG/M2 | SYSTOLIC BLOOD PRESSURE: 119 MMHG | HEART RATE: 78 BPM

## 2019-05-06 DIAGNOSIS — M75.112 INCOMPLETE TEAR OF LEFT ROTATOR CUFF: Primary | ICD-10-CM

## 2019-05-06 DIAGNOSIS — L57.0 ACTINIC KERATOSIS: ICD-10-CM

## 2019-05-06 DIAGNOSIS — Z00.00 ROUTINE GENERAL MEDICAL EXAMINATION AT A HEALTH CARE FACILITY: ICD-10-CM

## 2019-05-06 DIAGNOSIS — L71.9 ROSACEA: ICD-10-CM

## 2019-05-06 DIAGNOSIS — E55.9 VITAMIN D DEFICIENCY: ICD-10-CM

## 2019-05-06 DIAGNOSIS — Z12.31 ENCOUNTER FOR SCREENING MAMMOGRAM FOR BREAST CANCER: ICD-10-CM

## 2019-05-06 DIAGNOSIS — Z13.29 SCREENING FOR HYPOTHYROIDISM: ICD-10-CM

## 2019-05-06 DIAGNOSIS — R42 VERTIGO: ICD-10-CM

## 2019-05-06 DIAGNOSIS — M65.30 TRIGGER FINGER, ACQUIRED: ICD-10-CM

## 2019-05-06 DIAGNOSIS — Z12.11 SPECIAL SCREENING FOR MALIGNANT NEOPLASMS, COLON: ICD-10-CM

## 2019-05-06 LAB
ALBUMIN SERPL-MCNC: 3.6 G/DL (ref 3.4–5)
ALBUMIN UR-MCNC: NEGATIVE MG/DL
ALP SERPL-CCNC: 87 U/L (ref 40–150)
ALT SERPL W P-5'-P-CCNC: 20 U/L (ref 0–50)
ANION GAP SERPL CALCULATED.3IONS-SCNC: 7 MMOL/L (ref 3–14)
APPEARANCE UR: CLEAR
AST SERPL W P-5'-P-CCNC: 24 U/L (ref 0–45)
BACTERIA #/AREA URNS HPF: ABNORMAL /HPF
BILIRUB SERPL-MCNC: 0.5 MG/DL (ref 0.2–1.3)
BILIRUB UR QL STRIP: NEGATIVE
BUN SERPL-MCNC: 15 MG/DL (ref 7–30)
CALCIUM SERPL-MCNC: 9.4 MG/DL (ref 8.5–10.1)
CHLORIDE SERPL-SCNC: 108 MMOL/L (ref 94–109)
CHOLEST SERPL-MCNC: 197 MG/DL
CO2 SERPL-SCNC: 27 MMOL/L (ref 20–32)
COLOR UR AUTO: YELLOW
CREAT SERPL-MCNC: 0.7 MG/DL (ref 0.52–1.04)
ERYTHROCYTE [DISTWIDTH] IN BLOOD BY AUTOMATED COUNT: 13.1 % (ref 10–15)
GFR SERPL CREATININE-BSD FRML MDRD: 85 ML/MIN/{1.73_M2}
GLUCOSE SERPL-MCNC: 90 MG/DL (ref 70–99)
GLUCOSE UR STRIP-MCNC: NEGATIVE MG/DL
HCT VFR BLD AUTO: 39.6 % (ref 35–47)
HDLC SERPL-MCNC: 69 MG/DL
HGB BLD-MCNC: 13.5 G/DL (ref 11.7–15.7)
HGB UR QL STRIP: NEGATIVE
KETONES UR STRIP-MCNC: NEGATIVE MG/DL
LDLC SERPL CALC-MCNC: 108 MG/DL
LEUKOCYTE ESTERASE UR QL STRIP: ABNORMAL
MCH RBC QN AUTO: 31.4 PG (ref 26.5–33)
MCHC RBC AUTO-ENTMCNC: 34.1 G/DL (ref 31.5–36.5)
MCV RBC AUTO: 92 FL (ref 78–100)
NITRATE UR QL: NEGATIVE
NONHDLC SERPL-MCNC: 128 MG/DL
PH UR STRIP: 7 PH (ref 5–7)
PLATELET # BLD AUTO: 235 10E9/L (ref 150–450)
POTASSIUM SERPL-SCNC: 3.9 MMOL/L (ref 3.4–5.3)
PROT SERPL-MCNC: 7.6 G/DL (ref 6.8–8.8)
RBC # BLD AUTO: 4.3 10E12/L (ref 3.8–5.2)
RBC #/AREA URNS AUTO: ABNORMAL /HPF
SODIUM SERPL-SCNC: 142 MMOL/L (ref 133–144)
SOURCE: ABNORMAL
SP GR UR STRIP: 1.01 (ref 1–1.03)
TRIGL SERPL-MCNC: 98 MG/DL
TSH SERPL DL<=0.005 MIU/L-ACNC: 3.45 MU/L (ref 0.4–4)
UROBILINOGEN UR STRIP-ACNC: 0.2 EU/DL (ref 0.2–1)
WBC # BLD AUTO: 5.1 10E9/L (ref 4–11)
WBC #/AREA URNS AUTO: ABNORMAL /HPF

## 2019-05-06 PROCEDURE — 82306 VITAMIN D 25 HYDROXY: CPT | Performed by: INTERNAL MEDICINE

## 2019-05-06 PROCEDURE — G0439 PPPS, SUBSEQ VISIT: HCPCS | Mod: 25 | Performed by: INTERNAL MEDICINE

## 2019-05-06 PROCEDURE — 81001 URINALYSIS AUTO W/SCOPE: CPT | Performed by: INTERNAL MEDICINE

## 2019-05-06 PROCEDURE — 80061 LIPID PANEL: CPT | Performed by: INTERNAL MEDICINE

## 2019-05-06 PROCEDURE — 84443 ASSAY THYROID STIM HORMONE: CPT | Performed by: INTERNAL MEDICINE

## 2019-05-06 PROCEDURE — 20550 NJX 1 TENDON SHEATH/LIGAMENT: CPT | Performed by: INTERNAL MEDICINE

## 2019-05-06 PROCEDURE — 36415 COLL VENOUS BLD VENIPUNCTURE: CPT | Performed by: INTERNAL MEDICINE

## 2019-05-06 PROCEDURE — 77067 SCR MAMMO BI INCL CAD: CPT

## 2019-05-06 PROCEDURE — 80053 COMPREHEN METABOLIC PANEL: CPT | Performed by: INTERNAL MEDICINE

## 2019-05-06 PROCEDURE — 85027 COMPLETE CBC AUTOMATED: CPT | Performed by: INTERNAL MEDICINE

## 2019-05-06 RX ORDER — TRETINOIN 0.4 MG/G
GEL TOPICAL AT BEDTIME
Qty: 45 G | Refills: 3 | Status: SHIPPED | OUTPATIENT
Start: 2019-05-06 | End: 2020-07-27

## 2019-05-06 RX ORDER — DIAZEPAM 2 MG
2 TABLET ORAL EVERY 6 HOURS PRN
Qty: 10 TABLET | Refills: 1 | Status: SHIPPED | OUTPATIENT
Start: 2019-05-06

## 2019-05-06 ASSESSMENT — MIFFLIN-ST. JEOR: SCORE: 961.95

## 2019-05-06 ASSESSMENT — ACTIVITIES OF DAILY LIVING (ADL): CURRENT_FUNCTION: NO ASSISTANCE NEEDED

## 2019-05-06 NOTE — PROGRESS NOTES
"SUBJECTIVE:   Concepcion Velásquez is a 74 year old female who presents for Preventive Visit.    Are you in the first 12 months of your Medicare coverage?  No    Healthy Habits:    In general, how would you rate your overall health?  Good    Frequency of exercise:  6-7 days/week    Duration of exercise:  45-60 minutes    Do you usually eat at least 4 servings of fruit and vegetables a day, include whole grains    & fiber and avoid regularly eating high fat or \"junk\" foods?  No    Taking medications regularly:  Yes    Barriers to taking medications:  Not applicable    Medication side effects:  None    Ability to successfully perform activities of daily living:  No assistance needed    Home Safety:  No safety concerns identified    Hearing Impairment:  No hearing concerns    In the past 6 months, have you been bothered by leaking of urine?  No    In general, how would you rate your overall mental or emotional health?  Good      PHQ-2 Total Score:    Additional concerns today:  No    Chronic atrophic vaginitis which has been controlled with low dosages of preminon cream -- discontinued 2 weeks ago which causes vaginal discharge. Hasn't noticed any changes in her vaginitis -- no discharge over that time period.    Infrequent Episodes of Vertigo  She was given Valium (2mg) two years ago tid prn for episodes and had worked well for her -- she inquiries if it was treating her nausea from vertigo or the symptom itself. She recalls that it worked better than Antivert.    Left Trigger Thumb  Treated with imobility without any result.     Do you feel safe in your environment? Yes    Do you have a Health Care Directive? No: Advance care planning reviewed with patient; information given to patient to review.    Fall risk  Fallen 2 or more times in the past year?: Yes  Any fall with injury in the past year?: No    Cognitive Screening   1) Repeat 3 items (Leader, Season, Table)    2) Clock draw: NORMAL  3) 3 item recall: Recalls 3 " objects  Results: 3 items recalled: COGNITIVE IMPAIRMENT LESS LIKELY    Mini-CogTM Copyright HALIMA Harris. Licensed by the author for use in [facility]; reprinted with permission ([email or web address]@.[email or web address]). All rights reserved.      Do you have sleep apnea, excessive snoring or daytime drowsiness?: no    Reviewed and updated as needed this visit by clinical staff  Tobacco  Allergies  Meds  Problems  Med Hx  Surg Hx  Fam Hx         Reviewed and updated as needed this visit by Provider        Social History     Tobacco Use     Smoking status: Never Smoker     Smokeless tobacco: Never Used   Substance Use Topics     Alcohol use: Yes     Alcohol/week: 0.0 oz     Comment: 1/2 glass with dinner       Alcohol Use 5/25/2017   Prescreen: >3 drinks/day or >7 drinks/week? The patient does not drink >3 drinks per day nor >7 drinks per week.     Current providers sharing in care for this patient include:   Patient Care Team:  Jose Alberto Ziegler MD as PCP - General (Internal Medicine)  Jose Alberto Ziegler MD as Assigned PCP    The following health maintenance items are reviewed in Epic and correct as of today:  Health Maintenance   Topic Date Due     DTAP/TDAP/TD IMMUNIZATION (1 - Tdap) 05/06/1969     COLON CANCER SCREEN (SYSTEM ASSIGNED)  05/06/1994     ZOSTER IMMUNIZATION (1 of 2) 05/06/1994     ADVANCE DIRECTIVE PLANNING Q5 YRS  05/06/1999     MEDICARE ANNUAL WELLNESS VISIT  05/25/2018     FALL RISK ASSESSMENT  05/06/2020     MAMMO SCREEN Q2 YR (SYSTEM ASSIGNED)  05/06/2021     LIPID SCREEN Q5 YR FEMALE (SYSTEM ASSIGNED)  05/06/2024     DEXA SCAN SCREENING (SYSTEM ASSIGNED)  Completed     PHQ-2  Completed     INFLUENZA VACCINE  Completed     IPV IMMUNIZATION  Aged Out     MENINGITIS IMMUNIZATION  Aged Out     Labs reviewed in EPIC  Patient Active Problem List   Diagnosis     Closed nondisplaced fracture of first metatarsal bone of right foot, initial encounter     Incomplete tear of left rotator cuff     Dysuria     Cervical  cancer screening     Actinic keratosis     Routine general medical examination at a health care facility     Vitamin D deficiency     Past Surgical History:   Procedure Laterality Date     EYE SURGERY      Fuchs Dystrophy       Social History     Tobacco Use     Smoking status: Never Smoker     Smokeless tobacco: Never Used   Substance Use Topics     Alcohol use: Yes     Alcohol/week: 0.0 oz     Comment: 1/2 glass with dinner     Family History   Problem Relation Age of Onset     Family History Negative Unknown          Current Outpatient Medications   Medication Sig Dispense Refill     aspirin 81 MG tablet Take by mouth daily       Cholecalciferol (VITAMIN D3 PO) Take 1,000 Units by mouth daily       diazepam (VALIUM) 2 MG tablet Take 1 tablet (2 mg) by mouth every 6 hours as needed for anxiety 10 tablet 1     estrogens, conjugated, (PREMARIN) 0.3 MG tablet Take 1 tablet (0.3 mg) by mouth daily 30 tablet 1     MECLIZINE HCL PO Take 12.5 mg by mouth as needed        Multiple Vitamins-Minerals (CENTRUM PO)        tretinoin microsphere (RETIN-A MICRO) 0.04 % external gel Apply topically At Bedtime 45 g 3     vitamin  B complex with vitamin C (VITAMIN  B COMPLEX) TABS Take 1 tablet by mouth daily       No Known Allergies    Review of Systems  CONSTITUTIONAL: NEGATIVE for fever, chills, change in weight  INTEGUMENTARY/SKIN: NEGATIVE for worrisome rashes, moles or lesions  EYES: NEGATIVE for vision changes or irritation; seen her eye doctor in the last year, seasonal allergies. Left sided low back pain localized at the SI joint which is better with stretching. Infrequent chiropractic sessions  ENT/MOUTH: NEGATIVE for ear, mouth and throat problems  RESP: NEGATIVE for significant cough or SOB  BREAST: NEGATIVE for masses, tenderness or discharge  CV: NEGATIVE for chest pain, palpitations or peripheral edema; dog walking 5x week for 45 minutes  GI: NEGATIVE for nausea, abdominal pain, heartburn, or change in bowel  "habits  : NEGATIVE for frequency, dysuria, or hematuria, denies nocturia; minor stress incontinence.   MUSCULOSKELETAL: NEGATIVE for significant arthralgias or myalgia  NEURO: NEGATIVE for weakness, dizziness or paresthesias  ENDOCRINE: NEGATIVE for temperature intolerance, skin/hair changes  HEME: NEGATIVE for bleeding problems  PSYCHIATRIC: NEGATIVE for changes in mood or affect    OBJECTIVE:   /79 (BP Location: Left arm, Patient Position: Sitting, Cuff Size: Adult Regular)   Pulse 78   Temp 97.6  F (36.4  C) (Oral)   Ht 1.588 m (5' 2.5\")   Wt 50.6 kg (111 lb 8 oz)   SpO2 99%   BMI 20.07 kg/m   Estimated body mass index is 20.07 kg/m  as calculated from the following:    Height as of this encounter: 1.588 m (5' 2.5\").    Weight as of this encounter: 50.6 kg (111 lb 8 oz).  Physical Exam  HEENT funduscopic exam was within normal limits tympanic membranes are normal nose and throat were clear  Neck was supple without adenopathy thyromegaly or masses  Chest clear to auscultation and percussion  Cardiovascular S1 and S2 are physiologic without murmurs or gallop rhythm was regular  Abdomen bowel sounds are normal there are no palpable masses organomegaly or tenderness  Breasts are bilateral implants, showed no palpable masses, lymph nodes were negative  Extremities were nontender without any edema  Deep tendon reflexes were intact  Skin was clear  Pulses were two over 4+ bilaterally symmetrical throughout    Procedure Note (Left Thumb trigger finger)  The risks and benefits were discussed with the patient.  The skin was sterily prepped with betadine and alochol. Distal volar crease. A 25 gauge 5/8\" needle was placed and 20mg of Depo Medrol was injected without difficulty. Her thumb was imbolized by taping it together with her index finger.     ASSESSMENT / PLAN:   Concepcion was seen today for physical.    Diagnoses and all orders for this visit:    Incomplete tear of left rotator cuff  Improved spontaneously " "with rest.   -     Urine Microscopic    Routine general medical examination at a health care facility  -     UA reflex to Microscopic and Culture  -     CBC with platelets  -     Comprehensive metabolic panel  -     Lipid panel reflex to direct LDL Fasting  -     TSH with free T4 reflex  -     Vitamin D Deficiency    Actinic keratosis  Skin was without actinic keratosis.    Vitamin D deficiency  -     Vitamin D Deficiency    Screening for hypothyroidism  -     TSH with free T4 reflex    Special screening for malignant neoplasms, colon  -     Fecal colorectal cancer screen (FIT); Future    Encounter for screening mammogram for breast cancer  -     Cancel: *MA Screening Digital Bilateral; Future  -     Cancel: MA Screen with Implants Bilateral w/Javier; Future    Rosacea  Stable, refilled, continue gel.   -     tretinoin microsphere (RETIN-A MICRO) 0.04 % external gel; Apply topically At Bedtime    Vertigo  Receptive in the past, refilled, continue medication.   -     diazepam (VALIUM) 2 MG tablet; Take 1 tablet (2 mg) by mouth every 6 hours as needed for anxiety        End of Life Planning:  Patient currently has an advanced directive: No.  I have verified the patient's ablity to prepare an advanced directive/make health care decisions.  Literature was provided to assist patient in preparing an advanced directive.    COUNSELING:  Reviewed preventive health counseling, as reflected in patient instructions       Regular exercise       Healthy diet/nutrition    BP Readings from Last 1 Encounters:   05/06/19 119/79     Estimated body mass index is 20.07 kg/m  as calculated from the following:    Height as of this encounter: 1.588 m (5' 2.5\").    Weight as of this encounter: 50.6 kg (111 lb 8 oz).   reports that she has never smoked. She has never used smokeless tobacco.    Appropriate preventive services were discussed with this patient, including applicable screening as appropriate for cardiovascular disease, diabetes, " osteopenia/osteoporosis, and glaucoma.  As appropriate for age/gender, discussed screening for colorectal cancer, prostate cancer, breast cancer, and cervical cancer. Checklist reviewing preventive services available has been given to the patient.    Reviewed patients plan of care and provided an AVS. The Basic Care Plan (routine screening as documented in Health Maintenance) for Concepcion meets the Care Plan requirement. This Care Plan has been established and reviewed with the Patient.    Counseling Resources:  ATP IV Guidelines  Pooled Cohorts Equation Calculator  Breast Cancer Risk Calculator  FRAX Risk Assessment  ICSI Preventive Guidelines  Dietary Guidelines for Americans, 2010  USDA's MyPlate  ASA Prophylaxis  Lung CA Screening    Jose Alberto Ziegler MD  [facility]    Identified Health Risks:

## 2019-05-06 NOTE — LETTER
[facility]  [address] [facility]  [address]  [place]  [address]  Tel: [phone]    June 20, 2019    Concepcion Stonegner  [address]  [place] [address]        Concepcion,     A brief follow-up note from your recent wellness exam.  If you have not reviewed your lab he reports on my chart I am sending you this note to let you know how good you are.     Vitamin D is important for bone health and your vitamin D level remains in a very good normal range.   The TSH is a sensitive indicator of thyroid function and your thyroid is managing your metabolism well.     The lipid panel showed a your total cholesterol is 197.  The triglycerides were excellent and improved from a year ago.  Triglycerides were 98, better than 140 from a year ago and anything less than 150 is normal but anything less than 100 is better.  The HDL or good cholesterol was excellent at 69, anything greater than 50 is normal and the higher that value is the better off you are.  The LDL cholesterol is the most important factor and your LDL was 108, up from 81 a year ago.  The LDL is adequate at less than 130 and anything less than 100 is better.  Your LDL last time we had checked was 81.  The LDL is intimately related to fats in your diet.   The comprehensive metabolic panel showed that your minerals and electrolytes, kidney function and liver function tests were all normal.  Your blood sugar was normal as well, no sign of diabetes.   Your urinalysis was normal.  Your blood cell counts were normal as well, no sign of low blood or anemia.   The fit test was normal, no suspicions for colon cancer     If you have any questions regarding these reports please let me know.  I will look forward to seeing you in a year unless you are having problems sooner.     If you have any further questions or problems, please contact our office.      Sincerely,    Jose Alberto Ziegler MD/radha    Results for orders placed or performed in visit on 05/06/19   UA reflex to  Microscopic and Culture   Result Value Ref Range    Color Urine Yellow     Appearance Urine Clear     Glucose Urine Negative NEG^Negative mg/dL    Bilirubin Urine Negative NEG^Negative    Ketones Urine Negative NEG^Negative mg/dL    Specific Gravity Urine 1.015 1.003 - 1.035    Blood Urine Negative NEG^Negative    pH Urine 7.0 5.0 - 7.0 pH    Protein Albumin Urine Negative NEG^Negative mg/dL    Urobilinogen Urine 0.2 0.2 - 1.0 EU/dL    Nitrite Urine Negative NEG^Negative    Leukocyte Esterase Urine Small (A) NEG^Negative    Source Midstream Urine    CBC with platelets   Result Value Ref Range    WBC 5.1 4.0 - 11.0 10e9/L    RBC Count 4.30 3.8 - 5.2 10e12/L    Hemoglobin 13.5 11.7 - 15.7 g/dL    Hematocrit 39.6 35.0 - 47.0 %    MCV 92 78 - 100 fl    MCH 31.4 26.5 - 33.0 pg    MCHC 34.1 31.5 - 36.5 g/dL    RDW 13.1 10.0 - 15.0 %    Platelet Count 235 150 - 450 10e9/L   Comprehensive metabolic panel   Result Value Ref Range    Sodium 142 133 - 144 mmol/L    Potassium 3.9 3.4 - 5.3 mmol/L    Chloride 108 94 - 109 mmol/L    Carbon Dioxide 27 20 - 32 mmol/L    Anion Gap 7 3 - 14 mmol/L    Glucose 90 70 - 99 mg/dL    Urea Nitrogen 15 7 - 30 mg/dL    Creatinine 0.70 0.52 - 1.04 mg/dL    GFR Estimate 85 >60 mL/min/[1.73_m2]    GFR Estimate If Black >90 >60 mL/min/[1.73_m2]    Calcium 9.4 8.5 - 10.1 mg/dL    Bilirubin Total 0.5 0.2 - 1.3 mg/dL    Albumin 3.6 3.4 - 5.0 g/dL    Protein Total 7.6 6.8 - 8.8 g/dL    Alkaline Phosphatase 87 40 - 150 U/L    ALT 20 0 - 50 U/L    AST 24 0 - 45 U/L   Lipid panel reflex to direct LDL Fasting   Result Value Ref Range    Cholesterol 197 <200 mg/dL    Triglycerides 98 <150 mg/dL    HDL Cholesterol 69 >49 mg/dL    LDL Cholesterol Calculated 108 (H) <100 mg/dL    Non HDL Cholesterol 128 <130 mg/dL   TSH with free T4 reflex   Result Value Ref Range    TSH 3.45 0.40 - 4.00 mU/L   Vitamin D Deficiency   Result Value Ref Range    Vitamin D Deficiency screening 47 20 - 75 ug/L   Urine Microscopic    Result Value Ref Range    WBC Urine 0 - 5 OTO5^0 - 5 /HPF    RBC Urine O - 2 OTO2^O - 2 /HPF    Bacteria Urine Few (A) NEG^Negative /HPF               Enclosure: Lab Results

## 2019-05-07 LAB — DEPRECATED CALCIDIOL+CALCIFEROL SERPL-MC: 47 UG/L (ref 20–75)

## 2019-05-17 PROCEDURE — 82274 ASSAY TEST FOR BLOOD FECAL: CPT | Performed by: INTERNAL MEDICINE

## 2019-05-19 LAB — HEMOCCULT STL QL IA: NEGATIVE

## 2019-05-20 DIAGNOSIS — Z12.11 SPECIAL SCREENING FOR MALIGNANT NEOPLASMS, COLON: ICD-10-CM

## 2019-09-18 ENCOUNTER — OFFICE VISIT (OUTPATIENT)
Dept: FAMILY MEDICINE | Facility: CLINIC | Age: 74
End: 2019-09-18
Payer: COMMERCIAL

## 2019-09-18 VITALS
TEMPERATURE: 97.3 F | DIASTOLIC BLOOD PRESSURE: 75 MMHG | SYSTOLIC BLOOD PRESSURE: 132 MMHG | OXYGEN SATURATION: 95 % | WEIGHT: 112 LBS | HEART RATE: 80 BPM | BODY MASS INDEX: 20.16 KG/M2

## 2019-09-18 DIAGNOSIS — L71.9 ROSACEA: ICD-10-CM

## 2019-09-18 DIAGNOSIS — E55.9 VITAMIN D DEFICIENCY: ICD-10-CM

## 2019-09-18 DIAGNOSIS — M65.30 TRIGGER FINGER, ACQUIRED: Primary | ICD-10-CM

## 2019-09-18 DIAGNOSIS — L57.0 ACTINIC KERATOSIS: ICD-10-CM

## 2019-09-18 DIAGNOSIS — Z23 NEED FOR PROPHYLACTIC VACCINATION AND INOCULATION AGAINST INFLUENZA: ICD-10-CM

## 2019-09-18 DIAGNOSIS — Z12.11 SPECIAL SCREENING FOR MALIGNANT NEOPLASMS, COLON: ICD-10-CM

## 2019-09-18 PROCEDURE — G0008 ADMIN INFLUENZA VIRUS VAC: HCPCS | Performed by: INTERNAL MEDICINE

## 2019-09-18 PROCEDURE — 20550 NJX 1 TENDON SHEATH/LIGAMENT: CPT | Performed by: INTERNAL MEDICINE

## 2019-09-18 PROCEDURE — 99212 OFFICE O/P EST SF 10 MIN: CPT | Mod: 25 | Performed by: INTERNAL MEDICINE

## 2019-09-18 PROCEDURE — 90662 IIV NO PRSV INCREASED AG IM: CPT | Performed by: INTERNAL MEDICINE

## 2019-09-18 NOTE — PROGRESS NOTES
"SUBJECTIVE:   Concepcion Velásquez is a 74 year old female who presents for Preventive Visit.  {PVP to remind patient that this is not necessarily a physical exam; physical exam may or may not be done:039606::\"click delete button to remove this line now\"}  {PVP to inform patient that additional E&M charge may apply, if additional problems addressed:033446::\"click delete button to remove this line now\"}  Are you in the first 12 months of your Medicare coverage?  { :138906::\"No\"}    HPI  Do you feel safe in your environment? { :752647}    Do you have a Health Care Directive? { :211035}    {Hearing Test Done (Optional):808024}  Fall risk  { :733803}  {If any of the above assessments are answered yes, consider ordering appropriate referrals (Optional):373418::\"click delete button to remove this line now\"}  Cognitive Screening { :383419}    {Do you have sleep apnea, excessive snoring or daytime drowsiness? (Optional):465601}    Reviewed and updated as needed this visit by clinical staff         Reviewed and updated as needed this visit by Provider        Social History     Tobacco Use     Smoking status: Never Smoker     Smokeless tobacco: Never Used   Substance Use Topics     Alcohol use: Yes     Alcohol/week: 0.0 oz     Comment: 1/2 glass with dinner     {Rooming Staff- Complete this question if Prescreen response is not shown below for today's visit. If you drink alcohol do you typically have >3 drinks per day or >7 drinks per week? (Optional):356712}    Alcohol Use 5/25/2017   Prescreen: >3 drinks/day or >7 drinks/week? The patient does not drink >3 drinks per day nor >7 drinks per week.   {add AUDIT responses (Optional) (A score of 7 for adult men is an indication of hazardous drinking; a score of 8 or more is an indication of an alcohol use disorder.  A score of 7 or more for adult women is an indication of hazardous drinking or an alchohol use disorder):167385}    {Outside tests to abstract? :032705}    {additional " "problems to add (Optional):326425}    Current providers sharing in care for this patient include: {Rooming staff:  Please update Care Team in Rooming Activity, refresh this note and then delete this statement}  Patient Care Team:  Jose Alberto Ziegler MD as PCP - General (Internal Medicine)  Jose Alberto Ziegler MD as Assigned PCP    The following health maintenance items are reviewed in Epic and correct as of today:  Health Maintenance   Topic Date Due     ADVANCE CARE PLANNING  1945     COLONOSCOPY  1954     DTAP/TDAP/TD IMMUNIZATION (1 - Tdap) 1969     ZOSTER IMMUNIZATION (1 of 2) 1994     PNEUMOCOCCAL IMMUNIZATION 65+ LOW/MEDIUM RISK (1 of 2 - PCV13) 2009     INFLUENZA VACCINE (1) 2019     MEDICARE ANNUAL WELLNESS VISIT  2020     FALL RISK ASSESSMENT  2020     MAMMO SCREENING  2021     LIPID  2024     DEXA  Completed     PHQ-2  Completed     IPV IMMUNIZATION  Aged Out     MENINGITIS IMMUNIZATION  Aged Out     {Chronicprobdata (optional):230856}  {Decision Support (Optional):579101}    Review of Systems  {ROS COMP (Optional):466481}    OBJECTIVE:   There were no vitals taken for this visit. Estimated body mass index is 20.07 kg/m  as calculated from the following:    Height as of 19: 1.588 m (5' 2.5\").    Weight as of 19: 50.6 kg (111 lb 8 oz).  Physical Exam  {Exam (Optional) :260890}    {Diagnostic Test Results (Optional):342077::\"Diagnostic Test Results:\",\"Labs reviewed in Epic\"}    ASSESSMENT / PLAN:   {Diag Picklist:313515}    End of Life Planning:  Patient currently has an advanced directive: { :115959}    COUNSELING:  {Medicare Counselin}    Estimated body mass index is 20.07 kg/m  as calculated from the following:    Height as of 19: 1.588 m (5' 2.5\").    Weight as of 19: 50.6 kg (111 lb 8 oz).    {Weight Management Plan (ACO) Complete if BMI is abnormal-  Ages 18-64  BMI >24.9.  Age 65+ with BMI <23 or >30 (Optional):196998}     " reports that she has never smoked. She has never used smokeless tobacco.  {Tobacco Cessation -- Complete if patient is a smoker (Optional):256665}    Appropriate preventive services were discussed with this patient, including applicable screening as appropriate for cardiovascular disease, diabetes, osteopenia/osteoporosis, and glaucoma.  As appropriate for age/gender, discussed screening for colorectal cancer, prostate cancer, breast cancer, and cervical cancer. Checklist reviewing preventive services available has been given to the patient.    Reviewed patients plan of care and provided an AVS. The {CarePlan:651145} for Concepcion meets the Care Plan requirement. This Care Plan has been established and reviewed with the {PATIENT, FAMILY MEMBER, CAREGIVER:654009}.    Counseling Resources:  ATP IV Guidelines  Pooled Cohorts Equation Calculator  Breast Cancer Risk Calculator  FRAX Risk Assessment  ICSI Preventive Guidelines  Dietary Guidelines for Americans, 2010  USDA's MyPlate  ASA Prophylaxis  Lung CA Screening    Jose Alberto Ziegler MD  [facility]    Identified Health Risks:

## 2019-09-18 NOTE — PROGRESS NOTES
Subjective     Concepcion Velásquez is a 74 year old female who presents to clinic today for the following health issues:    HPI   Follow up  -left thumb trigger finger. The pt inquires of another cortisone injection. States that she has been having minor/faint clicking in her left 1st MCP joint. The pt denies that she buddy tapes her thumb.     Patient Active Problem List   Diagnosis     Closed nondisplaced fracture of first metatarsal bone of right foot, initial encounter     Incomplete tear of left rotator cuff     Dysuria     Cervical cancer screening     Actinic keratosis     Routine general medical examination at a health care facility     Vitamin D deficiency     Past Surgical History:   Procedure Laterality Date     EYE SURGERY      Fuchs Dystrophy       Social History     Tobacco Use     Smoking status: Never Smoker     Smokeless tobacco: Never Used   Substance Use Topics     Alcohol use: Yes     Alcohol/week: 0.0 oz     Comment: 1/2 glass with dinner     Family History   Problem Relation Age of Onset     Family History Negative Unknown          Current Outpatient Medications   Medication Sig Dispense Refill     aspirin 81 MG tablet Take by mouth daily       Cholecalciferol (VITAMIN D3 PO) Take 1,000 Units by mouth daily       diazepam (VALIUM) 2 MG tablet Take 1 tablet (2 mg) by mouth every 6 hours as needed for anxiety 10 tablet 1     estrogens, conjugated, (PREMARIN) 0.3 MG tablet Take 1 tablet (0.3 mg) by mouth daily 30 tablet 1     MECLIZINE HCL PO Take 12.5 mg by mouth as needed        Multiple Vitamins-Minerals (CENTRUM PO)        tretinoin microsphere (RETIN-A MICRO) 0.04 % external gel Apply topically At Bedtime 45 g 3     vitamin  B complex with vitamin C (VITAMIN  B COMPLEX) TABS Take 1 tablet by mouth daily       No Known Allergies    Reviewed and updated as needed this visit by Provider         Review of Systems   ROS COMP: Constitutional, HEENT, cardiovascular, pulmonary, gi and gu systems are  "negative, except as otherwise noted.    This document serves as a record of the services and decisions personally performed and made by Jose Albetro Ziegler MD. It was created on his behalf by Lico Cardozo, a trained medical scribe. The creation of this document is based on the provider's statements to the medical scribe.  Lico Cardozo September 18, 2019 10:16 AM          Objective    /75 (BP Location: Left arm, Cuff Size: Adult Regular)   Pulse 80   Temp 97.3  F (36.3  C) (Tympanic)   Wt 50.8 kg (112 lb)   SpO2 95%   BMI 20.16 kg/m    Body mass index is 20.16 kg/m .     Physical Exam   Neck was supple without adenopathy or thyromegaly her carotids were normal without bruits  Chest clear to auscultation and percussion  Cardiovascular S1 and S2 are physiologic without murmurs or gallops  Abdomen bowel sounds were normal.  There is no palpable mass or organomegaly  Extremities nontender without any edema  Pulses pedal pulses are as described otherwise his pulses are bilaterally symmetrical throughout without bruits  Skin without significant abnormality    Procedure Note: Left A1 Pulley of the Thumb   The risks and benefits were discussed with the patient.  The skin was sterily prepped with Betadine and alcohol. A 25 gauge 5/8\" needle was placed and 20mg of Depo Medrol was injected without difficulty. Recommended to continue dayanara taping at HS.       Diagnostic Test Results:  Labs reviewed in Epic  No results found for this or any previous visit (from the past 24 hour(s)).        Assessment & Plan     Trigger finger, acquired  See procedure notes. The pt was re-instructed on how to dayanara tape her left thumb. Recommended dayanara taping her thumb at night. Pt should apply heat to her left 1st MCP to help loosen the joint.    Actinic keratosis  Well controlled with current therapies     Vitamin D deficiency      Rosacea      Special screening for malignant neoplasms, colon    - Fecal colorectal cancer screen " (FIT)    Need for prophylactic vaccination and inoculation against influenza  Elected to have a high dose flu vaccination done today in clinic  - INFLUENZA (HIGH DOSE) 3 VALENT VACCINE [73572]  - ADMIN INFLUENZA (For MEDICARE Patients ONLY) []            FUTURE APPOINTMENTS:       - Follow-up visit in 6 mo    No follow-ups on file.     The information in this document, created by the medical scribe for me, accurately reflects the services I personally performed and the decisions made by me. I have reviewed and approved this document for accuracy prior to leaving the patient care area.  September 18, 2019 10:46 AM    Jose Alberto Ziegler MD  [facility]

## 2020-07-26 ENCOUNTER — MYC MEDICAL ADVICE (OUTPATIENT)
Dept: FAMILY MEDICINE | Facility: CLINIC | Age: 75
End: 2020-07-26

## 2020-07-26 DIAGNOSIS — L71.9 ROSACEA: ICD-10-CM

## 2020-07-27 RX ORDER — TRETINOIN 0.4 MG/G
GEL TOPICAL AT BEDTIME
Qty: 45 G | Refills: 3 | Status: SHIPPED | OUTPATIENT
Start: 2020-07-27

## 2020-07-27 NOTE — TELEPHONE ENCOUNTER
To PCP:     Pt requests WRITTEN/signed copy of pended Rx.     Please review and authorize if appropriate,     Thank you,   Lizzie VALERA RN

## 2020-09-21 NOTE — NURSING NOTE
"Chief Complaint   Patient presents with     Derm Problem       Initial /81 (BP Location: Left arm, Patient Position: Sitting, Cuff Size: Adult Regular)  Pulse 84  Temp 97.2  F (36.2  C) (Oral)  Ht 5' 2.5\" (1.588 m)  Wt 112 lb (50.8 kg)  SpO2 97%  Breastfeeding? No  BMI 20.16 kg/m2 Estimated body mass index is 20.16 kg/(m^2) as calculated from the following:    Height as of this encounter: 5' 2.5\" (1.588 m).    Weight as of this encounter: 112 lb (50.8 kg).  Medication Reconciliation: complete   Kika Gonzalez- PARUL      " Noted

## 2021-01-14 ENCOUNTER — HEALTH MAINTENANCE LETTER (OUTPATIENT)
Age: 76
End: 2021-01-14

## 2021-10-24 ENCOUNTER — HEALTH MAINTENANCE LETTER (OUTPATIENT)
Age: 76
End: 2021-10-24

## 2022-02-13 ENCOUNTER — HEALTH MAINTENANCE LETTER (OUTPATIENT)
Age: 77
End: 2022-02-13

## 2022-10-15 ENCOUNTER — HEALTH MAINTENANCE LETTER (OUTPATIENT)
Age: 77
End: 2022-10-15

## 2023-03-26 ENCOUNTER — HEALTH MAINTENANCE LETTER (OUTPATIENT)
Age: 78
End: 2023-03-26